# Patient Record
Sex: FEMALE | Race: BLACK OR AFRICAN AMERICAN | NOT HISPANIC OR LATINO | Employment: OTHER | URBAN - METROPOLITAN AREA
[De-identification: names, ages, dates, MRNs, and addresses within clinical notes are randomized per-mention and may not be internally consistent; named-entity substitution may affect disease eponyms.]

---

## 2018-12-20 ENCOUNTER — LAB VISIT (OUTPATIENT)
Dept: LAB | Facility: HOSPITAL | Age: 43
End: 2018-12-20
Attending: OBSTETRICS & GYNECOLOGY
Payer: MEDICAID

## 2018-12-20 ENCOUNTER — OFFICE VISIT (OUTPATIENT)
Dept: OBSTETRICS AND GYNECOLOGY | Facility: CLINIC | Age: 43
End: 2018-12-20
Payer: MEDICAID

## 2018-12-20 VITALS
DIASTOLIC BLOOD PRESSURE: 84 MMHG | HEIGHT: 64 IN | BODY MASS INDEX: 27.85 KG/M2 | SYSTOLIC BLOOD PRESSURE: 134 MMHG | WEIGHT: 163.13 LBS

## 2018-12-20 DIAGNOSIS — Z32.02 NEGATIVE PREGNANCY TEST: ICD-10-CM

## 2018-12-20 DIAGNOSIS — N92.6 MENSTRUAL PERIOD LATE: ICD-10-CM

## 2018-12-20 DIAGNOSIS — N92.6 MENSTRUAL PERIOD LATE: Primary | ICD-10-CM

## 2018-12-20 LAB
B-HCG UR QL: NEGATIVE
CTP QC/QA: YES
HCG INTACT+B SERPL-ACNC: <1.2 MIU/ML

## 2018-12-20 PROCEDURE — 81025 URINE PREGNANCY TEST: CPT | Mod: PBBFAC | Performed by: OBSTETRICS & GYNECOLOGY

## 2018-12-20 PROCEDURE — 84702 CHORIONIC GONADOTROPIN TEST: CPT

## 2018-12-20 PROCEDURE — 99202 OFFICE O/P NEW SF 15 MIN: CPT | Mod: PBBFAC | Performed by: OBSTETRICS & GYNECOLOGY

## 2018-12-20 PROCEDURE — 36415 COLL VENOUS BLD VENIPUNCTURE: CPT

## 2018-12-20 PROCEDURE — 99203 OFFICE O/P NEW LOW 30 MIN: CPT | Mod: S$PBB,,, | Performed by: OBSTETRICS & GYNECOLOGY

## 2018-12-20 PROCEDURE — 99999 PR PBB SHADOW E&M-NEW PATIENT-LVL II: CPT | Mod: PBBFAC,,, | Performed by: OBSTETRICS & GYNECOLOGY

## 2018-12-20 RX ORDER — SERTRALINE HYDROCHLORIDE 100 MG/1
200 TABLET, FILM COATED ORAL EVERY MORNING
Refills: 0 | COMMUNITY
Start: 2018-12-03

## 2018-12-20 RX ORDER — DEXTROAMPHETAMINE SACCHARATE, AMPHETAMINE ASPARTATE, DEXTROAMPHETAMINE SULFATE AND AMPHETAMINE SULFATE 7.5; 7.5; 7.5; 7.5 MG/1; MG/1; MG/1; MG/1
1 TABLET ORAL 2 TIMES DAILY
Refills: 0 | COMMUNITY
Start: 2018-12-03

## 2018-12-20 RX ORDER — DIAZEPAM 10 MG/1
10 TABLET ORAL 4 TIMES DAILY
Refills: 0 | COMMUNITY
Start: 2018-12-03

## 2018-12-20 RX ORDER — DICLOFENAC SODIUM 50 MG/1
50 TABLET, DELAYED RELEASE ORAL 3 TIMES DAILY PRN
Refills: 0 | COMMUNITY
Start: 2018-12-03

## 2018-12-20 RX ORDER — ZOLPIDEM TARTRATE 10 MG/1
10 TABLET ORAL NIGHTLY
Refills: 0 | COMMUNITY
Start: 2018-12-03

## 2018-12-20 RX ORDER — TRAMADOL HYDROCHLORIDE 50 MG/1
TABLET ORAL
Refills: 0 | COMMUNITY
Start: 2018-12-03

## 2018-12-24 NOTE — PROGRESS NOTES
[facility]  Ambulatory Clinic  Obstetrics & Gynecology    Visit Date:  2018    Chief Complaint:  Late period    History of Present Illness:      Renetta Lepe is a 43 y.o. , new pt to me, here for a gynecologic exam with c/o late mense prior to scheduling apt.    Pt was concerned she was pregnant.    Patient's last menstrual period was 2018 (exact date).    UPT negative 2018.    Pt is currently on her menses.    Menses are regular, not heavy or painful.    Pt current method of family planning is condom/natural family planning.    Pt denies h/o abnormal pap, last pap ~1 years ago on [facility] Dr. Francis per pt.    Pt denies active sexually transmitted infections.    Pt denies h/o abnormal mammogram, last mammo  at [facility] per pt.    Pt performs monthly self breast examination, smokes tobacco, uses seat belts, and denies abuse.     Pt denies abnormal vaginal bleeding, vaginal discharge, dysmenorrhea, dyspareunia, pelvic pain, bloating, early satiety, unintentional weight loss, breast mass/skin changes, incontinence, GI or urinary complaints.      Past History:  Gynecologic history as noted above.    Review of Systems:      GENERAL:  No fever, fatigue, excessive weight gain or loss  HEENT:  No headaches, hearing changes, visual disturbance  RESPIRATORY:  No cough, shortness of breath  CARDIOVASCULAR:  No chest pain, heart palpitations, leg swelling  BREAST:  No lump, pain, nipple discharge, skin changes  GASTROINTESTINAL:  No nausea, vomiting, constipation, diarrhea, abd pain, rectal bleeding   GENITOURINARY:  See HPI  ENDOCRINE:  No heat or cold intolerance  HEMATOLOGIC:  No easy bruisability or bleeding   LYMPHATICS:  No enlarged nodes  MUSCULOSKELETAL:  No joint pain or swelling  SKIN:  No rash, lesions, jaundice  NEUROLOGIC:  No dizziness, weakness, syncope  PSYCHIATRIC:  No homicidal/suicidal ideations    Physical Exam:     /84 (BP Location: Right arm,  "Patient Position: Sitting)   Ht 5' 4" (1.626 m)   Wt 74 kg (163 lb 2.3 oz)   LMP 2018 (Exact Date)   BMI 28.00 kg/m²   Pulse 70, Resp rate 16     GENERAL:  No acute distress, well-nourished  HEENT:  Atraumatic, anicteric, moist mucus membranes. Neck supple w/o masses.  BREAST:  Pt declined.  LUNGS:  Clear to auscultation  HEART:  Regular rate and rhythm, no murmurs, gallops, or rubs  ABDOMEN:  Soft, non-tender, non-distended, normoactive bowel sounds, no obvious organomegaly  EXT:  Symmetric w/o cramping, claudication, or edema. +2 distal pulses.  SKIN:  No rashes or bruising  PSYCH:  Mood and affect appropriate  NEURO:  Grossly intact bilaterally, FROM,  no sensory or motor deficits   PELVIC:  Pt declined.    Component      Latest Ref Rng & Units 2018   hCG Quant      See Text mIU/mL <1.2     Assessment:     43 y.o. :    1. Late period, UPT negative    Plan:    Discussed late period.  UPT negative today.  Pt requesting HCG.  Pregnancy precautions.  Return to clinic if no menses in 4-6 wks or positive UPT.    Pt due for annual GYN.  Pt advised to call clinic ASAP to schedule.    Encourage healthy lifestyle modifications.    F/u with PCP for health maintenance.     All questions answered, voiced understanding.      Morris Mcelroy MD    "